# Patient Record
Sex: FEMALE | Race: WHITE | Employment: FULL TIME | URBAN - METROPOLITAN AREA
[De-identification: names, ages, dates, MRNs, and addresses within clinical notes are randomized per-mention and may not be internally consistent; named-entity substitution may affect disease eponyms.]

---

## 2017-04-03 ENCOUNTER — OFFICE VISIT (OUTPATIENT)
Dept: FAMILY MEDICINE | Facility: CLINIC | Age: 42
End: 2017-04-03
Payer: COMMERCIAL

## 2017-04-03 VITALS
HEART RATE: 107 BPM | DIASTOLIC BLOOD PRESSURE: 70 MMHG | OXYGEN SATURATION: 98 % | TEMPERATURE: 98.4 F | SYSTOLIC BLOOD PRESSURE: 109 MMHG

## 2017-04-03 DIAGNOSIS — R05.9 COUGH: Primary | ICD-10-CM

## 2017-04-03 PROCEDURE — 99213 OFFICE O/P EST LOW 20 MIN: CPT | Performed by: FAMILY MEDICINE

## 2017-04-03 RX ORDER — AZITHROMYCIN 250 MG/1
TABLET, FILM COATED ORAL
Qty: 6 TABLET | Refills: 0 | Status: SHIPPED | OUTPATIENT
Start: 2017-04-03 | End: 2017-04-13

## 2017-04-03 RX ORDER — BENZONATATE 200 MG/1
200 CAPSULE ORAL 3 TIMES DAILY PRN
Qty: 21 CAPSULE | Refills: 0 | Status: SHIPPED | OUTPATIENT
Start: 2017-04-03 | End: 2017-04-13

## 2017-04-03 NOTE — MR AVS SNAPSHOT
"              After Visit Summary   4/3/2017    Nidhi Nolan    MRN: [id number]           Patient Information     Date Of Birth          1975        Visit Information        Provider Department      4/3/2017 2:00 PM David Rodriguez MD [facility]        Today's Diagnoses     Cough    -  1       Follow-ups after your visit        Who to contact     If you have questions or need follow up information about today's clinic visit or your schedule please contact [facility] directly at [phone].  Normal or non-critical lab and imaging results will be communicated to you by [email or web address], letter or phone within 4 business days after the clinic has received the results. If you do not hear from us within 7 days, please contact the clinic through [email or web address] or phone. If you have a critical or abnormal lab result, we will notify you by phone as soon as possible.  Submit refill requests through [facility] or call your pharmacy and they will forward the refill request to us. Please allow 3 business days for your refill to be completed.          Additional Information About Your Visit        MyChart Information     [facility] lets you send messages to your doctor, view your test results, renew your prescriptions, schedule appointments and more. To sign up, go to [email or web address] . Click on \"Log in\" on the left side of the screen, which will take you to the Welcome page. Then click on \"Sign up Now\" on the right side of the page.     You will be asked to enter the access code listed below, as well as some personal information. Please follow the directions to create your username and password.     Your access code is: [email or web address]  Expires: 2017  2:19 PM     Your access code will  in 90 days. If you need help or a new code, please call your [facility] or [phone].        Care EveryWhere ID     This is your Care EveryWhere ID. This could be used by other organizations to " access your [place] medical records  [id number]        Your Vitals Were     Pulse Temperature Pulse Oximetry             107 98.4  F (36.9  C) (Oral) 98%          Blood Pressure from Last 3 Encounters:   04/03/17 109/70   10/22/15 110/78   04/21/15 121/70    Weight from Last 3 Encounters:   12/12/16 167 lb (75.8 kg)   10/22/15 170 lb 9.6 oz (77.4 kg)   04/21/15 166 lb 6.4 oz (75.5 kg)              Today, you had the following     No orders found for display       Primary Care Provider    Eddy Espinal MD       No address on file        Thank you!     Thank you for choosing [facility]  for your care. Our goal is always to provide you with excellent care. Hearing back from our patients is one way we can continue to improve our services. Please take a few minutes to complete the written survey that you may receive in the mail after your visit with us. Thank you!             Your Updated Medication List - Protect others around you: Learn how to safely use, store and throw away your medicines at www.disposemymeds.org.          This list is accurate as of: 4/3/17  2:19 PM.  Always use your most recent med list.                   Brand Name Dispense Instructions for use    famciclovir 500 MG tablet    FAMVIR    60 tablet    Take 1 tablet (500 mg) by mouth 3 times daily       ferrous sulfate 325 (65 FE) MG tablet    IRON     Take 1 tablet (325 mg) by mouth At Bedtime       hydrOXYzine 25 MG tablet    ATARAX    30 tablet    Take 1-2 tablets by mouth every 6 hours as needed for itching.       mupirocin 2 % ointment    BACTROBAN    30 g    apply to nares with q-tip twice daily until symptoms resolve       pramipexole 0.125 MG tablet    MIRAPEX    90 tablet    Take 1 tablet (0.125 mg) by mouth At Bedtime       rizatriptan 5 MG tablet    MAXALT    18 tablet    Take 1-2 tablets (5-10 mg) by mouth at onset of headache for migraine May repeat dose in 2 hours.  Do not exceed 30 mg in 24 hours       SOLUBLE  FIBER/PROBIOTICS Chew     30 tablet    Take 1 tablet by mouth daily.       triamcinolone 0.1 % cream    KENALOG    60 g    Apply topically 2 times daily Apply sparingly to affected area of arms, legs, and trunk.

## 2017-04-03 NOTE — PROGRESS NOTES
SUBJECTIVE:                                                    Nidhi Nolan is a 42 year old female who presents to clinic today for the following health issues:    RESPIRATORY SYMPTOMS      Duration: x5 days    Description  nasal congestion, rhinorrhea, sore throat, facial pain/pressure, cough, chills, ear pain right, headache, fatigue/malaise, hoarse voice and myalgias    Severity: moderate    Accompanying signs and symptoms: None    History (predisposing factors):  none    Precipitating or alleviating factors: None    Therapies tried and outcome:  OTC NSAID     Past Medical History:   Diagnosis Date     Abnormal Pap smear of cervix     done elsewhere     History of colposcopy with cervical biopsy     done elsewhere       Current Outpatient Prescriptions   Medication Sig Dispense Refill     pramipexole (MIRAPEX) 0.125 MG tablet Take 1 tablet (0.125 mg) by mouth At Bedtime 90 tablet 3     rizatriptan (MAXALT) 5 MG tablet Take 1-2 tablets (5-10 mg) by mouth at onset of headache for migraine May repeat dose in 2 hours.  Do not exceed 30 mg in 24 hours 18 tablet 1     ferrous sulfate (IRON) 325 (65 FE) MG tablet Take 1 tablet (325 mg) by mouth At Bedtime       famciclovir (FAMVIR) 500 MG tablet Take 1 tablet (500 mg) by mouth 3 times daily 60 tablet 3     mupirocin (BACTROBAN) 2 % ointment apply to nares with q-tip twice daily until symptoms resolve 30 g 0     triamcinolone (KENALOG) 0.1 % cream Apply topically 2 times daily Apply sparingly to affected area of arms, legs, and trunk. 60 g 0     hydrOXYzine (ATARAX) 25 MG tablet Take 1-2 tablets by mouth every 6 hours as needed for itching. 30 tablet 1     Probiotic Product (SOLUBLE FIBER/PROBIOTICS) CHEW Take 1 tablet by mouth daily. 30 tablet 12       Social History   Substance Use Topics     Smoking status: Never Smoker     Smokeless tobacco: Never Used     Alcohol use Yes      Comment: kassy FLAHERTY  Review of systems negative except as stated  above.    OBJECTIVE:  /70  Pulse 107  Temp 98.4  F (36.9  C) (Oral)  SpO2 98%     GENERAL APPEARANCE: healthy, alert and mild distress  EYES: EOMI,  PERRL, conjunctiva clear  HENT: ear canals and TM's normal.  Nose and mouth without ulcers, erythema or lesions  NECK: supple, nontender, no lymphadenopathy  RESP: lungs clear to auscultation - no rales, rhonchi or wheezes  CV: regular rates and rhythm, normal S1 S2, no murmur noted  NEURO: Normal strength and tone, sensory exam grossly normal,  normal speech and mentation  SKIN: no suspicious lesions or rashes    ASSESSMENT:  Cough    PLAN:  abx and cough med  See orders in Epic  Symptomatic measures encouraged, humidified air, plenty of fluids.

## 2017-04-03 NOTE — NURSING NOTE
"Chief Complaint   Patient presents with     URI       Initial /70  Pulse 107  Temp 98.4  F (36.9  C) (Oral)  SpO2 98% Estimated body mass index is 25.96 kg/(m^2) as calculated from the following:    Height as of 10/22/15: 5' 7.25\" (1.708 m).    Weight as of 12/12/16: 167 lb (75.8 kg).  Medication Reconciliation: complete     Sri Hernandez CMA (AAMA)        "

## 2017-04-13 ENCOUNTER — TELEPHONE (OUTPATIENT)
Dept: FAMILY MEDICINE | Facility: CLINIC | Age: 42
End: 2017-04-13

## 2017-04-13 NOTE — TELEPHONE ENCOUNTER
"Pt calling c/o on going cough.   She states it is worse at night while laying down.   She also feels like ears may be plugged \"because sometime I am getting dizzy\"     Pt denies fever, no wheezing, chest pain  or SOB.  Denies HA, N/V no sinus pressure     She recently finished Zpack for \"bronchilits\"     Offered appt but pt is out of town for the next 2 days.     Advised Add Neti-pot or sinus wash 2-4 X day. Push fluids. Can add OTC nasal steroid.  Try Afrin for no more than 3 days.  Try OTC Delsym or Robitussin.   If develops fever, chest pain or develops respiratory symptoms be seen in UC while out of town.   F/U in clinic as needed.     Ghislaine Escalona, RN        "

## 2017-05-26 DIAGNOSIS — G25.81 RESTLESS LEGS SYNDROME (RLS): ICD-10-CM

## 2017-05-26 RX ORDER — PRAMIPEXOLE DIHYDROCHLORIDE 0.12 MG/1
TABLET ORAL
Qty: 90 TABLET | Refills: 1 | OUTPATIENT
Start: 2017-05-26

## 2017-05-26 NOTE — TELEPHONE ENCOUNTER
Pending Prescriptions:                       Disp   Refills    pramipexole (MIRAPEX) 0.125 MG tablet [Ph*90 tab*1            Sig: TAKE ONE TABLET BY MOUTH AT BEDTIME               Last Written Prescription Date: 12/12/2016  Last Fill Quantity: 90, # refills: 3  Last Office Visit with [facility], [facility] or [facility] prescribing provider: 12/12/2016, Sharita        BP Readings from Last 3 Encounters:   04/03/17 109/70   10/22/15 110/78   04/21/15 121/70

## 2017-07-27 ENCOUNTER — TRANSFERRED RECORDS (OUTPATIENT)
Dept: HEALTH INFORMATION MANAGEMENT | Facility: CLINIC | Age: 42
End: 2017-07-27

## 2017-12-13 DIAGNOSIS — G25.81 RESTLESS LEGS SYNDROME (RLS): ICD-10-CM

## 2017-12-14 RX ORDER — PRAMIPEXOLE DIHYDROCHLORIDE 0.12 MG/1
0.12 TABLET ORAL AT BEDTIME
Qty: 30 TABLET | Refills: 0 | Status: SHIPPED | OUTPATIENT
Start: 2017-12-14 | End: 2018-01-26

## 2017-12-14 NOTE — TELEPHONE ENCOUNTER
Prescription approved per [facility] Refill Protocol.  For 30 day fill and LM again today for call back to schedule OV    Ghislaine Escalona RN

## 2018-01-17 ENCOUNTER — TRANSFERRED RECORDS (OUTPATIENT)
Dept: HEALTH INFORMATION MANAGEMENT | Facility: CLINIC | Age: 43
End: 2018-01-17

## 2018-01-22 ENCOUNTER — ALLIED HEALTH/NURSE VISIT (OUTPATIENT)
Dept: NURSING | Facility: CLINIC | Age: 43
End: 2018-01-22
Payer: COMMERCIAL

## 2018-01-22 DIAGNOSIS — Z23 NEED FOR PROPHYLACTIC VACCINATION AND INOCULATION AGAINST INFLUENZA: Primary | ICD-10-CM

## 2018-01-22 PROCEDURE — 90686 IIV4 VACC NO PRSV 0.5 ML IM: CPT

## 2018-01-22 PROCEDURE — 90471 IMMUNIZATION ADMIN: CPT

## 2018-01-22 NOTE — MR AVS SNAPSHOT
"              After Visit Summary   1/22/2018    Nidhi Nolan    MRN: [id number]           Patient Information     Date Of Birth          1975        Visit Information        Provider Department      1/22/2018 3:00 PM ESTER XIONG/LPN [facility]        Today's Diagnoses     Need for prophylactic vaccination and inoculation against influenza    -  1       Follow-ups after your visit        Your next 10 appointments already scheduled     Jan 22, 2018  3:00 PM CST   Nurse Only with LV MA/LPN   [facility] ([facility]    [address] [address] [phone]              Who to contact     If you have questions or need follow up information about today's clinic visit or your schedule please contact [facility] directly at [phone].  Normal or non-critical lab and imaging results will be communicated to you by MyChart, letter or phone within 4 business days after the clinic has received the results. If you do not hear from us within 7 days, please contact the clinic through MyChart or phone. If you have a critical or abnormal lab result, we will notify you by phone as soon as possible.  Submit refill requests through [employer] or call your pharmacy and they will forward the refill request to us. Please allow 3 business days for your refill to be completed.          Additional Information About Your Visit        MyChart Information     [employer] lets you send messages to your doctor, view your test results, renew your prescriptions, schedule appointments and more. To sign up, go to [email or web address] . Click on \"Log in\" on the left side of the screen, which will take you to the Welcome page. Then click on \"Sign up Now\" on the right side of the page.     You will be asked to enter the access code listed below, as well as some personal information. Please follow the directions to create your username and password.     Your access " code is: [email or web address]  Expires: 2018  2:23 PM     Your access code will  in 90 days. If you need help or a new code, please call your [place] clinic or [phone].        Care EveryWhere ID     This is your Care EveryWhere ID. This could be used by other organizations to access your [place] medical records  [id number]         Blood Pressure from Last 3 Encounters:   17 109/70   10/22/15 110/78   04/21/15 121/70    Weight from Last 3 Encounters:   16 167 lb (75.8 kg)   10/22/15 170 lb 9.6 oz (77.4 kg)   04/21/15 166 lb 6.4 oz (75.5 kg)              We Performed the Following     FLU VAC, SPLIT VIRUS IM > 3 YO (QUADRIVALENT) [76798]     Vaccine Administration, Initial [43418]        Primary Care Provider Fax #    Physician No Ref-Primary [phone]       No address on file        Equal Access to Services     ALLEN FLORES : Hadii aad ku hadasho Soomaali, waaxda luqadaha, qaybta kaalmada adeegyada, waxay idiin hayelvinn angelina alejandre . So [facility] [place] [phone].    ATENCIÓN: Si habla español, tiene a alexis disposición servicios gratuitos de asistencia lingüística. Llame al [phone].    We comply with applicable federal civil rights laws and [place] laws. We do not discriminate on the basis of race, color, national origin, age, disability, sex, sexual orientation, or gender identity.            Thank you!     Thank you for choosing [facility]  for your care. Our goal is always to provide you with excellent care. Hearing back from our patients is one way we can continue to improve our services. Please take a few minutes to complete the written survey that you may receive in the mail after your visit with us. Thank you!             Your Updated Medication List - Protect others around you: Learn how to safely use, store and throw away your medicines at www.disposemymeds.org.          This list is accurate as of: 18  2:23 PM.  Always use your most recent med list.                    Brand Name Dispense Instructions for use Diagnosis    famciclovir 500 MG tablet    FAMVIR    60 tablet    Take 1 tablet (500 mg) by mouth 3 times daily    HSV (herpes simplex virus) infection       ferrous sulfate 325 (65 FE) MG tablet    IRON     Take 1 tablet (325 mg) by mouth At Bedtime    Restless legs syndrome (RLS)       hydrOXYzine 25 MG tablet    ATARAX    30 tablet    Take 1-2 tablets by mouth every 6 hours as needed for itching.    Atopic dermatitis       mupirocin 2 % ointment    BACTROBAN    30 g    apply to nares with q-tip twice daily until symptoms resolve    Impetigo       pramipexole 0.125 MG tablet    MIRAPEX    30 tablet    Take 1 tablet (0.125 mg) by mouth At Bedtime    Restless legs syndrome (RLS)       rizatriptan 5 MG tablet    MAXALT    18 tablet    Take 1-2 tablets (5-10 mg) by mouth at onset of headache for migraine May repeat dose in 2 hours.  Do not exceed 30 mg in 24 hours    Migraine without aura and without status migrainosus, not intractable       SOLUBLE FIBER/PROBIOTICS Chew     30 tablet    Take 1 tablet by mouth daily.    Yeast infection of the vagina       triamcinolone 0.1 % cream    KENALOG    60 g    Apply topically 2 times daily Apply sparingly to affected area of arms, legs, and trunk.    Atopic dermatitis

## 2018-01-22 NOTE — PROGRESS NOTES

## 2018-01-24 DIAGNOSIS — G25.81 RESTLESS LEGS SYNDROME (RLS): ICD-10-CM

## 2018-01-24 NOTE — TELEPHONE ENCOUNTER
Routing refill request to provider for review/approval because:  Kia given x1 and patient did not follow up, please advise  Margarita Bojorquez RN, BSN

## 2018-01-24 NOTE — TELEPHONE ENCOUNTER
"Requested Prescriptions   Pending Prescriptions Disp Refills     pramipexole (MIRAPEX) 0.125 MG tablet [Pharmacy Med Name: PRAMIPEXOLE 0.125 MG TABLET] 30 tablet 0    Last Written Prescription Date:  12/14/2017  Last Fill Quantity: 30 tablet,  # refills: 0   Last Office Visit with [facility], [facility] or [facility] prescribing provider:  12/12/2016   Future Office Visit:      Sig: PT NEEDS TO BE SEEN-TAKE 1 TABLET (0.125 MG) BY MOUTH AT BEDTIME    Antiparkinson's Agents Protocol Passed    1/24/2018  9:07 AM       Passed - Blood pressure under 140/90    BP Readings from Last 3 Encounters:   04/03/17 109/70   10/22/15 110/78   04/21/15 121/70                Passed - Recent or future visit with authorizing provider's specialty    Patient had office visit in the last year or has a visit in the next 30 days with authorizing provider.  See \"Patient Info\" tab in inbasket, or \"Choose Columns\" in Meds & Orders section of the refill encounter.            Passed - Patient is age 18 or older       Passed - No active pregnancy on record       Passed - No positive pregnancy test in the past 12 months          Justin CAMERON  "

## 2018-01-25 RX ORDER — PRAMIPEXOLE DIHYDROCHLORIDE 0.12 MG/1
TABLET ORAL
Qty: 30 TABLET | Refills: 0 | OUTPATIENT
Start: 2018-01-25

## 2018-01-26 RX ORDER — PRAMIPEXOLE DIHYDROCHLORIDE 0.12 MG/1
0.12 TABLET ORAL AT BEDTIME
Qty: 30 TABLET | Refills: 1 | Status: SHIPPED | OUTPATIENT
Start: 2018-01-26 | End: 2018-01-31

## 2018-01-26 NOTE — TELEPHONE ENCOUNTER
"Pt calling stating she \"can't sleep without this medication\" and that she never saw that she needs an appt or got calls from the clinic.  Reviewed phone number to make sure we have the correct number.    Pt is wanting a refill prior to her appt 7on 1/31/18.    Please advise   Margarita Bojorquez RN, BSN    "

## 2018-01-26 NOTE — TELEPHONE ENCOUNTER
Refilled 1 month to give her time to follow-up.  Should have annual visit for any ongoing prescription.  Scheduled follow-up ok.

## 2018-01-31 ENCOUNTER — OFFICE VISIT (OUTPATIENT)
Dept: FAMILY MEDICINE | Facility: CLINIC | Age: 43
End: 2018-01-31
Payer: COMMERCIAL

## 2018-01-31 VITALS
HEART RATE: 80 BPM | WEIGHT: 164.9 LBS | DIASTOLIC BLOOD PRESSURE: 70 MMHG | TEMPERATURE: 97.8 F | BODY MASS INDEX: 25.88 KG/M2 | HEIGHT: 67 IN | SYSTOLIC BLOOD PRESSURE: 106 MMHG | OXYGEN SATURATION: 98 %

## 2018-01-31 DIAGNOSIS — B00.9 HSV (HERPES SIMPLEX VIRUS) INFECTION: ICD-10-CM

## 2018-01-31 DIAGNOSIS — G25.81 RESTLESS LEGS SYNDROME (RLS): ICD-10-CM

## 2018-01-31 DIAGNOSIS — Z00.00 ROUTINE ADULT HEALTH MAINTENANCE: Primary | ICD-10-CM

## 2018-01-31 DIAGNOSIS — G43.009 MIGRAINE WITHOUT AURA AND WITHOUT STATUS MIGRAINOSUS, NOT INTRACTABLE: ICD-10-CM

## 2018-01-31 LAB
CHOLEST SERPL-MCNC: 163 MG/DL
GLUCOSE SERPL-MCNC: 81 MG/DL (ref 70–99)
HDLC SERPL-MCNC: 70 MG/DL
LDLC SERPL CALC-MCNC: 77 MG/DL
NONHDLC SERPL-MCNC: 93 MG/DL
TRIGL SERPL-MCNC: 80 MG/DL

## 2018-01-31 PROCEDURE — 99396 PREV VISIT EST AGE 40-64: CPT | Performed by: NURSE PRACTITIONER

## 2018-01-31 PROCEDURE — 36415 COLL VENOUS BLD VENIPUNCTURE: CPT | Performed by: NURSE PRACTITIONER

## 2018-01-31 PROCEDURE — 82947 ASSAY GLUCOSE BLOOD QUANT: CPT | Performed by: NURSE PRACTITIONER

## 2018-01-31 PROCEDURE — 80061 LIPID PANEL: CPT | Performed by: NURSE PRACTITIONER

## 2018-01-31 RX ORDER — RIZATRIPTAN BENZOATE 5 MG/1
5-10 TABLET ORAL
Qty: 18 TABLET | Refills: 1 | Status: SHIPPED | OUTPATIENT
Start: 2018-01-31

## 2018-01-31 RX ORDER — FAMCICLOVIR 500 MG/1
500 TABLET ORAL 3 TIMES DAILY
Qty: 60 TABLET | Refills: 3 | Status: SHIPPED | OUTPATIENT
Start: 2018-01-31

## 2018-01-31 RX ORDER — PRAMIPEXOLE DIHYDROCHLORIDE 0.12 MG/1
0.12 TABLET ORAL AT BEDTIME
Qty: 90 TABLET | Refills: 3 | Status: SHIPPED | OUTPATIENT
Start: 2018-01-31 | End: 2018-05-07

## 2018-01-31 NOTE — PROGRESS NOTES
SUBJECTIVE:   CC: Nihdi Nolan is an 42 year old woman who presents for preventive health visit.     Physical   Annual:     Getting at least 3 servings of Calcium per day::  Yes    Bi-annual eye exam::  NO    Dental care twice a year::  Yes    Sleep apnea or symptoms of sleep apnea::  None    Diet::  Regular (no restrictions)    Frequency of exercise::  6-7 days/week    Duration of exercise::  45-60 minutes    Taking medications regularly::  Yes    Additional concerns today::  No          Here for a complete physical exam. Declines a pap today. 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 Requesting refills on medications. No other concerns. Fasting for labs.           Today's PHQ-2 Score:   PHQ-2 ( 1999 Pfizer) 1/31/2018   Q1: Little interest or pleasure in doing things 0   Q2: Feeling down, depressed or hopeless 0   PHQ-2 Score 0   Q1: Little interest or pleasure in doing things Not at all   Q2: Feeling down, depressed or hopeless Not at all   PHQ-2 Score 0       Abuse: Current or Past(Physical, Sexual or Emotional)- No  Do you feel safe in your environment - Yes    Social History   Substance Use Topics     Smoking status: Never Smoker     Smokeless tobacco: Never Used     Alcohol use Yes      Comment: kassy     Alcohol Use 1/31/2018   If you drink alcohol, do you typically have greater than 3 drinks per day OR greater than 7 drinks per week?   No   No flowsheet data found.    Reviewed orders with patient.  Reviewed health maintenance and updated orders accordingly - Yes  Patient Active Problem List   Diagnosis     Migraines     Hyperlipidemia LDL goal <160     Past Surgical History:   Procedure Laterality Date     GYN SURGERY         Social History   Substance Use Topics     Smoking status: Never Smoker     Smokeless tobacco: Never Used     Alcohol use Yes      Comment: kassy     Family History   Problem Relation Age of Onset     Hypertension Mother      Hypertension Father      CANCER  "Father      multiple mieloma     C.A.D. Maternal Grandfather      MI     Cardiovascular Maternal Grandfather      Breast Cancer Paternal Grandmother            Mammo discussed, not appropriate for or declined by this patient.    Pertinent mammograms are reviewed under the imaging tab.  History of abnormal Pap smear: NO - age 30- 65 PAP every 3 years recommended    Reviewed and updated as needed this visit by clinical staff  Tobacco  Allergies  Meds  Med Hx  Surg Hx  Fam Hx  Soc Hx        Reviewed and updated as needed this visit by Provider  Allergies  Meds  Med Hx  Surg Hx  Fam Hx            Review of Systems  C: NEGATIVE for fever, chills, change in weight  I: NEGATIVE for worrisome rashes, moles or lesions  E: NEGATIVE for vision changes or irritation  ENT: NEGATIVE for ear, mouth and throat problems  R: NEGATIVE for significant cough or SOB  B: NEGATIVE for masses, tenderness or discharge  CV: NEGATIVE for chest pain, palpitations or peripheral edema  GI: NEGATIVE for nausea, abdominal pain, heartburn, or change in bowel habits  : NEGATIVE for unusual urinary or vaginal symptoms. Periods are regular.  M: NEGATIVE for significant arthralgias or myalgia  N: NEGATIVE for weakness, dizziness or paresthesias  P: NEGATIVE for changes in mood or affect     OBJECTIVE:   /70 (BP Location: Right arm, Patient Position: Chair, Cuff Size: Adult Regular)  Pulse 80  Temp 97.8  F (36.6  C) (Oral)  Ht 5' 7.25\" (1.708 m)  Wt 164 lb 14.4 oz (74.8 kg)  LMP 01/21/2018 (Approximate)  SpO2 98%  Breastfeeding? No  BMI 25.64 kg/m2  Physical Exam  GENERAL: healthy, alert and no distress  EYES: Eyes grossly normal to inspection, PERRL and conjunctivae and sclerae normal  HENT: ear canals and TM's normal, nose and mouth without ulcers or lesions  NECK: no adenopathy, no asymmetry, masses, or scars and thyroid normal to palpation  RESP: lungs clear to auscultation - no rales, rhonchi or wheezes  CV: regular rate " "and rhythm, normal S1 S2, no S3 or S4, no murmur, click or rub, no peripheral edema and peripheral pulses strong  ABDOMEN: soft, nontender, no hepatosplenomegaly, no masses and bowel sounds normal  MS: cyst and swelling of right knee, managed by orthopedics.   SKIN: no suspicious lesions or rashes  PSYCH: mentation appears normal, affect normal/bright    ASSESSMENT/PLAN:   1. Routine adult health maintenance  Fasting labs drawn.   - GLUCOSE  - Lipid panel reflex to direct LDL Fasting    2. Restless legs syndrome (RLS)  Using Mirapex nightly and is effective.  - pramipexole (MIRAPEX) 0.125 MG tablet; Take 1 tablet (0.125 mg) by mouth At Bedtime  Dispense: 90 tablet; Refill: 3    3. Migraine without aura and without status migrainosus, not intractable   Rarely having migraines but is requesting a refill on Maxalt.  - rizatriptan (MAXALT) 5 MG tablet; Take 1-2 tablets (5-10 mg) by mouth at onset of headache for migraine May repeat dose in 2 hours.  Do not exceed 30 mg in 24 hours  Dispense: 18 tablet; Refill: 1    4. HSV (herpes simplex virus) infection   Stable. Refilled.  - famciclovir (FAMVIR) 500 MG tablet; Take 1 tablet (500 mg) by mouth 3 times daily  Dispense: 60 tablet; Refill: 3    COUNSELING:  Reviewed preventive health counseling, as reflected in patient instructions       Regular exercise       Healthy diet/nutrition       Vision screening         reports that she has never smoked. She has never used smokeless tobacco.    Estimated body mass index is 25.64 kg/(m^2) as calculated from the following:    Height as of this encounter: 5' 7.25\" (1.708 m).    Weight as of this encounter: 164 lb 14.4 oz (74.8 kg).       Counseling Resources:  ATP IV Guidelines  Pooled Cohorts Equation Calculator  Breast Cancer Risk Calculator  FRAX Risk Assessment  ICSI Preventive Guidelines  Dietary Guidelines for Americans, 2010  USDA's MyPlate  ASA Prophylaxis  Lung CA Screening    Alicia Walsh, DAVID  [facility] " [place]  Answers for HPI/ROS submitted by the patient on 1/31/2018   PHQ-2 Score: 0

## 2018-01-31 NOTE — NURSING NOTE
"Chief Complaint   Patient presents with     Physical       Initial /70 (BP Location: Right arm, Patient Position: Chair, Cuff Size: Adult Regular)  Pulse 80  Temp 97.8  F (36.6  C) (Oral)  Ht 5' 7.25\" (1.708 m)  Wt 164 lb 14.4 oz (74.8 kg)  LMP 01/21/2018 (Approximate)  SpO2 98%  Breastfeeding? No  BMI 25.64 kg/m2 Estimated body mass index is 25.64 kg/(m^2) as calculated from the following:    Height as of this encounter: 5' 7.25\" (1.708 m).    Weight as of this encounter: 164 lb 14.4 oz (74.8 kg).  Medication Reconciliation: complete   Taylor Morgan SMA      "

## 2018-01-31 NOTE — MR AVS SNAPSHOT
After Visit Summary   1/31/2018    Nidhi Nolan    MRN: [id number]           Patient Information     Date Of Birth          1975        Visit Information        Provider Department      1/31/2018 8:00 AM Alicia Walsh NP [facility]        Today's Diagnoses     Routine adult health maintenance    -  1    Restless legs syndrome (RLS)        Migraine without aura and without status migrainosus, not intractable        HSV (herpes simplex virus) infection          Care Instructions      Preventive Health Recommendations  Female Ages 40 to 49    Yearly exam:     See your health care provider every year in order to  1. Review health changes.   2. Discuss preventive care.    3. Review your medicines if your doctor prescribed any.      Get a Pap test every three years (unless you have an abnormal result and your provider advises testing more often).      If you get Pap tests with HPV test, you only need to test every 5 years, unless you have an abnormal result. You do not need a Pap test if your uterus was removed (hysterectomy) and you have not had cancer.      You should be tested each year for STDs (sexually transmitted diseases), if you're at risk.       Ask your doctor if you should have a mammogram.      Have a colonoscopy (test for colon cancer) if someone in your family has had colon cancer or polyps before age 50.       Have a cholesterol test every 5 years.       Have a diabetes test (fasting glucose) after age 45. If you are at risk for diabetes, you should have this test every 3 years.    Shots: Get a flu shot each year. Get a tetanus shot every 10 years.     Nutrition:     Eat at least 5 servings of fruits and vegetables each day.    Eat whole-grain bread, whole-wheat pasta and brown rice instead of white grains and rice.    Talk to your provider about Calcium and Vitamin D.     Lifestyle    Exercise at least 150 minutes a week (an average of 30 minutes a day, 5 days a  week). This will help you control your weight and prevent disease.    Limit alcohol to one drink per day.    No smoking.     Wear sunscreen to prevent skin cancer.    See your dentist every six months for an exam and cleaning.          Follow-ups after your visit        Your next 10 appointments already scheduled     Feb 07, 2018  3:00 PM CST   (Arrive by 2:45 PM)   US JOINT INJECTION ASPIRATION INTERMEDIATE RIGHT with RSCCUS1, [facility] IMAGING NURSE, [facility] ([facility])    [address]  [facility] [address] [phone]           Tell us in advance if there s any chance you may be pregnant.  Bring a list of your medicines to the exam. Include vitamins, minerals and over-the-counter drugs.  If you take blood thinners, you may need to stop taking them a few days before treatment. Talk to your doctor before stopping these medicines. You will need a blood test the morning of your exam.   Stop taking Coumadin (warfarin) 3 days before your exam. Restart the day after your exam.   If you take aspirin, you may need to stop taking it 3 days before your scan.   If you take Plavix, Ticlid, Pletal or Persantine, you may need to stop taking them 5 days before your scan. Please talk to your doctor before stopping these medicines.  If you will receive sedation for this test (medicine to help you relax):   See your family doctor for an exam within 30 days of treatment.   Plan for an adult to drive you home and stay with you for at least 6 hours.   Follow the eating and drinking guidelines checked below:   No eating or drinking for 4 hours before your test. You may take medicine with small sips of water.   If you have diabetes:If you take insulin, call your diabetes care team. Do not take diabetes pills on the morning of your test. If you take metformin (Avandamet, Glucophage, Glucovance, Metaglip) and received contrast, wait 48 hours before  "re-starting this medicine.  Please call the Imaging Department at your exam site with any questions.              Who to contact     If you have questions or need follow up information about today's clinic visit or your schedule please contact [facility] directly at [phone].  Normal or non-critical lab and imaging results will be communicated to you by MyChart, letter or phone within 4 business days after the clinic has received the results. If you do not hear from us within 7 days, please contact the clinic through [employer] or phone. If you have a critical or abnormal lab result, we will notify you by phone as soon as possible.  Submit refill requests through [employer] or call your pharmacy and they will forward the refill request to us. Please allow 3 business days for your refill to be completed.          Additional Information About Your Visit        MyChart Information     [employer] lets you send messages to your doctor, view your test results, renew your prescriptions, schedule appointments and more. To sign up, go to [email or web address] . Click on \"Log in\" on the left side of the screen, which will take you to the Welcome page. Then click on \"Sign up Now\" on the right side of the page.     You will be asked to enter the access code listed below, as well as some personal information. Please follow the directions to create your username and password.     Your access code is: [email or web address]  Expires: 2018  2:23 PM     Your access code will  in 90 days. If you need help or a new code, please call your [facility] or [phone].        Care EveryWhere ID     This is your Care EveryWhere ID. This could be used by other organizations to access your [facility] medical records  [id number]        Your Vitals Were     Pulse Temperature Height Last Period Pulse Oximetry Breastfeeding?    80 97.8  F (36.6  C) (Oral) 5' 7.25\" (1.708 m) 2018 (Approximate) 98% No    BMI (Body Mass " Index)                   25.64 kg/m2            Blood Pressure from Last 3 Encounters:   01/31/18 106/70   04/03/17 109/70   10/22/15 110/78    Weight from Last 3 Encounters:   01/31/18 164 lb 14.4 oz (74.8 kg)   12/12/16 167 lb (75.8 kg)   10/22/15 170 lb 9.6 oz (77.4 kg)              We Performed the Following     GLUCOSE     Lipid panel reflex to direct LDL Fasting          Where to get your medicines      These medications were sent to [address] [address] [place] [id number] [facility] [address]    Hours:  Tech issues with their phone system Phone:  [phone]     famciclovir 500 MG tablet    pramipexole 0.125 MG tablet    rizatriptan 5 MG tablet          Primary Care Provider Office Phone # Fax #    Alicia KIM DAVID Walsh [phone]       [facility] [address] [address] [address]        Equal Access to Services     ALLEN FLORES AH: Hadii aad ku hadasho Soomaali, waaxda luqadaha, qaybta kaalmada adeegyada, waxay idiin hayelvinn angelina alejandre . So [facility] [phone].    ATENCIÓN: Si habla español, tiene a alexis disposición servicios gratuitos de asistencia lingüística. Llame al [phone].    We comply with applicable federal civil rights laws and [place] laws. We do not discriminate on the basis of race, color, national origin, age, disability, sex, sexual orientation, or gender identity.            Thank you!     Thank you for choosing [facility]  for your care. Our goal is always to provide you with excellent care. Hearing back from our patients is one way we can continue to improve our services. Please take a few minutes to complete the written survey that you may receive in the mail after your visit with us. Thank you!             Your Updated Medication List - Protect others around you: Learn how to safely use, store and throw away your medicines at www.disposemymeds.org.          This list is accurate as of 1/31/18   8:36 AM.  Always use your most recent med list.                   Brand Name Dispense Instructions for use Diagnosis    famciclovir 500 MG tablet    FAMVIR    60 tablet    Take 1 tablet (500 mg) by mouth 3 times daily    HSV (herpes simplex virus) infection       pramipexole 0.125 MG tablet    MIRAPEX    90 tablet    Take 1 tablet (0.125 mg) by mouth At Bedtime    Restless legs syndrome (RLS)       rizatriptan 5 MG tablet    MAXALT    18 tablet    Take 1-2 tablets (5-10 mg) by mouth at onset of headache for migraine May repeat dose in 2 hours.  Do not exceed 30 mg in 24 hours    Migraine without aura and without status migrainosus, not intractable       SOLUBLE FIBER/PROBIOTICS Chew     30 tablet    Take 1 tablet by mouth daily.    Yeast infection of the vagina

## 2018-01-31 NOTE — LETTER
[facility]          [address] [place] [address]                                                                                                       [phone]      Nidhi Nolan  [address]  [facility] [place] [facility] [address]      Dear Nidhi,      Enclosed is a copy of the lab results.  The patient may continue her current plan of care.      Thank you for choosing [facility]. We appreciate the opportunity to serve you and look forward to supporting your healthcare needs in the future.    If you have any questions or concerns, please call me or my nurse at [phone].        Sincerely,      Alicia Walsh NP    Results for orders placed or performed in visit on 01/31/18   GLUCOSE   Result Value Ref Range    Glucose 81 70 - 99 mg/dL   Lipid panel reflex to direct LDL Fasting   Result Value Ref Range    Cholesterol 163 <200 mg/dL    Triglycerides 80 <150 mg/dL    HDL Cholesterol 70 >49 mg/dL    LDL Cholesterol Calculated 77 <100 mg/dL    Non HDL Cholesterol 93 <130 mg/dL

## 2018-02-07 ENCOUNTER — HOSPITAL ENCOUNTER (OUTPATIENT)
Dept: ULTRASOUND IMAGING | Facility: CLINIC | Age: 43
Discharge: HOME | End: 2018-02-07
Attending: ORTHOPAEDIC SURGERY | Admitting: ORTHOPAEDIC SURGERY
Payer: OTHER MISCELLANEOUS

## 2018-02-07 VITALS — HEART RATE: 79 BPM | SYSTOLIC BLOOD PRESSURE: 106 MMHG | DIASTOLIC BLOOD PRESSURE: 71 MMHG

## 2018-02-07 DIAGNOSIS — M25.561 KNEE PAIN, RIGHT: ICD-10-CM

## 2018-02-07 DIAGNOSIS — M71.21 BAKER'S CYST OF KNEE, RIGHT: ICD-10-CM

## 2018-02-07 PROCEDURE — 25000125 ZZHC RX 250: Performed by: PHYSICIAN ASSISTANT

## 2018-02-07 PROCEDURE — 20606 DRAIN/INJ JOINT/BURSA W/US: CPT | Mod: RT

## 2018-02-07 PROCEDURE — 25000125 ZZHC RX 250

## 2018-02-07 RX ORDER — LIDOCAINE HYDROCHLORIDE 10 MG/ML
10 INJECTION, SOLUTION EPIDURAL; INFILTRATION; INTRACAUDAL; PERINEURAL ONCE
Status: COMPLETED | OUTPATIENT
Start: 2018-02-07 | End: 2018-02-07

## 2018-02-07 RX ORDER — LIDOCAINE HYDROCHLORIDE 10 MG/ML
INJECTION, SOLUTION EPIDURAL; INFILTRATION; INTRACAUDAL; PERINEURAL
Status: COMPLETED
Start: 2018-02-07 | End: 2018-02-07

## 2018-02-07 RX ADMIN — LIDOCAINE HYDROCHLORIDE 3 ML: 10 INJECTION, SOLUTION EPIDURAL; INFILTRATION; INTRACAUDAL at 15:34

## 2018-02-07 RX ADMIN — LIDOCAINE HYDROCHLORIDE 3 ML: 10 INJECTION, SOLUTION EPIDURAL; INFILTRATION; INTRACAUDAL; PERINEURAL at 15:34

## 2018-02-07 RX ADMIN — LIDOCAINE HYDROCHLORIDE 3 ML: 10 INJECTION, SOLUTION EPIDURAL; INFILTRATION; INTRACAUDAL at 15:32

## 2018-02-07 NOTE — PROGRESS NOTES
Pt tolerated ortho procedure well, she was taken to Ultrasound for the procedure done by ALEKSANDER Lopez.Approximately 15 cc of clear yellow cystic fluid was removed from right bakers cyst. There were no complications during procedure. Pt verbalized understanding of written and verbal instructions and left department in stable and satisfactory condition. There is no evidence of bleeding upon discharge.

## 2018-03-12 ENCOUNTER — TELEPHONE (OUTPATIENT)
Dept: FAMILY MEDICINE | Facility: CLINIC | Age: 43
End: 2018-03-12

## 2018-03-12 DIAGNOSIS — B37.31 YEAST INFECTION OF THE VAGINA: Primary | ICD-10-CM

## 2018-03-12 RX ORDER — FLUCONAZOLE 150 MG/1
150 TABLET ORAL ONCE
Qty: 1 TABLET | Refills: 0 | Status: SHIPPED | OUTPATIENT
Start: 2018-03-12 | End: 2018-03-12

## 2018-03-12 NOTE — TELEPHONE ENCOUNTER
Patient is leaving to go on a trip 03/22, she will be spending a lot of time in the water and normally gets a yeast infection. She would like to know if Alicia can call in a Rx for her, patient states Dr. Duque did this a couple of years ago for her.  Patient can be reach on her cell phone if you have any questions.  Essence Garcias

## 2018-04-03 ENCOUNTER — OFFICE VISIT (OUTPATIENT)
Dept: FAMILY MEDICINE | Facility: CLINIC | Age: 43
End: 2018-04-03
Payer: COMMERCIAL

## 2018-04-03 VITALS — TEMPERATURE: 98.1 F | RESPIRATION RATE: 14 BRPM | OXYGEN SATURATION: 98 % | HEART RATE: 78 BPM

## 2018-04-03 DIAGNOSIS — R19.7 DIARRHEA, UNSPECIFIED TYPE: Primary | ICD-10-CM

## 2018-04-03 PROCEDURE — 99213 OFFICE O/P EST LOW 20 MIN: CPT | Performed by: NURSE PRACTITIONER

## 2018-04-03 NOTE — PROGRESS NOTES
SUBJECTIVE:   Nidhi Nolan is a 43 year old female who presents to clinic today for the following health issues:      ABDOMINAL PAIN     Onset: Ongoing For One Week. Started in [place] While on Vacation    Description:   Character: Sharp  Location: epigastric region  Radiation: None    Intensity: 5/10    Progression of Symptoms:  same    Accompanying Signs & Symptoms:  Fever/Chills?: YES- Chills  Gas/Bloating: YES- Bloating  Nausea: YES  Vomitting: no   Diarrhea?: YES  Constipation:no   Dysuria or Hematuria: no    History:   Trauma: no   Previous similar pain: no    Previous tests done: none    Precipitating factors:   Does the pain change with:     Food: YES- Gets Worse     BM: YES- Feels better     Urination: no     Alleviating factors:  Pepto Bismol has barely helped    Therapies Tried and outcome: Pepto Bismol and Imodium    LMP:  Approximately March 19.   Here with complaints of abdominal pain for the past week after returning from a trip to [place] [place] [facility] where she ate fresh fish and a salad. Has had ongoing issues with watery diarrhea and body aches. Chills and bloating. 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         Problem list and histories reviewed & adjusted, as indicated.  Additional history: none    Patient Active Problem List   Diagnosis     Migraines     Hyperlipidemia LDL goal <160     Past Surgical History:   Procedure Laterality Date     GYN SURGERY         Social History   Substance Use Topics     Smoking status: Never Smoker     Smokeless tobacco: Never Used     Alcohol use Yes      Comment: kassy     Family History   Problem Relation Age of Onset     Hypertension Mother      Hypertension Father      CANCER Father      multiple mieloma     C.A.D. Maternal Grandfather      MI     Cardiovascular Maternal Grandfather      Breast Cancer Paternal Grandmother            Reviewed and updated as needed this visit by clinical staff  Tobacco  Allergies   "Meds  Problems  Med Hx  Surg Hx  Fam Hx  Soc Hx        Reviewed and updated as needed this visit by Provider  Allergies  Meds  Problems  Med Hx  Surg Hx  Fam Hx         ROS:  Constitutional, HEENT, cardiovascular, pulmonary, gi and gu systems are negative, except as otherwise noted.    OBJECTIVE:     BP (P) 114/78 (BP Location: Right arm, Patient Position: Chair, Cuff Size: Adult Regular)  Pulse 78  Temp 98.1  F (36.7  C) (Oral)  Resp 14  Ht (P) 5' 7\" (1.702 m)  Wt (P) 158 lb 1.6 oz (71.7 kg)  SpO2 98%  BMI (P) 24.76 kg/m2  Body mass index is 24.76 kg/(m^2) (pended).  GENERAL: healthy, alert and no distress  EYES: Eyes grossly normal to inspection, PERRL and conjunctivae and sclerae normal  HENT: ear canals and TM's normal, nose and mouth without ulcers or lesions  NECK: no adenopathy, no asymmetry, masses, or scars and thyroid normal to palpation  RESP: lungs clear to auscultation - no rales, rhonchi or wheezes  CV: regular rate and rhythm, normal S1 S2, no S3 or S4, no murmur, click or rub, no peripheral edema and peripheral pulses strong  ABDOMEN: bowel sounds hyperactive. Tenderness throughout.   PSYCH: mentation appears normal, affect normal/bright        ASSESSMENT/PLAN:             1. Diarrhea, unspecified type  Will check stool cultures for infection. Advised to avoid use of Immodium for diarrhea.   - Enteric Bacteria and Virus Panel by GHASSAN Stool; Future  - Ova and Parasite Exam Routine; Future    Encouraged to focus on fluids and bland diet.     Alicia Walsh, NP  [facility]    "

## 2018-04-03 NOTE — MR AVS SNAPSHOT
"              After Visit Summary   4/3/2018    Nidhi Nolan    MRN: [id number]           Patient Information     Date Of Birth          1975        Visit Information        Provider Department      4/3/2018 10:45 AM Alicia Walsh NP [facility]        Today's Diagnoses     Diarrhea, unspecified type    -  1       Follow-ups after your visit        Your next 10 appointments already scheduled     Apr 10, 2018  2:00 PM CDT   (Arrive by 1:45 PM)   MA DIAGNOSTIC BILATERAL W/ LUCY with [id number]   [facility] ([facility])    [address]  [facility] [id number] [phone]           Do not use any powder, lotion or deodorant under your arms or on your breast. If you do, we will ask you to remove it before your exam.  Wear comfortable, two-piece clothing.  If you have any allergies, tell your care team.  Bring any previous mammograms from other facilities or have them mailed to the breast center.  Three-dimensional (3D) mammograms are available at [facility] locations in [facility], [facility], and [place]. [facility] locations include [place] and [facility] in [place]. Benefits of 3D mammograms include: - Improved rate of cancer detection - Decreases your chance of having to go back for more tests, which means fewer: - \"False-positive\" results (This means that there is an abnormal area but it isn't cancer.) - Invasive testing procedures, such as a biopsy or surgery - Can provide clearer images of the breast if you have dense breast tissue. 3D mammography is an optional exam that anyone can have with a 2D mammogram. It doesn't replace or take the place of a 2D mammogram. 2D mammograms remain an effective screening test for all women.  Not all insurance companies cover the cost of a 3D mammogram. Check with your insurance.            Apr 10, 2018  2:30 PM CDT   US BREAST RIGHT " "LIMITED 1-3 QUAD with RHBCUS1   [facility] Breast Ultrasound ([facility])    [address] [facility]  [facility] [address] [phone]           Please bring a list of your medicines (including vitamins, minerals and over-the-counter drugs). Also, tell your doctor about any allergies you may have. Wear comfortable clothes and leave your valuables at home.  You do not need to do anything special to prepare for your exam.  Please call the Imaging Department at your exam site with any questions.              Future tests that were ordered for you today     Open Future Orders        Priority Expected Expires Ordered    Enteric Bacteria and Virus Panel by GHASSAN Stool Routine  4/3/2019 4/3/2018    Ova and Parasite Exam Routine Routine  4/3/2019 4/3/2018            Who to contact     If you have questions or need follow up information about today's clinic visit or your schedule please contact [facility] directly at [phone].  Normal or non-critical lab and imaging results will be communicated to you by [employer], letter or phone within 4 business days after the clinic has received the results. If you do not hear from us within 7 days, please contact the clinic through [employer] or phone. If you have a critical or abnormal lab result, we will notify you by phone as soon as possible.  Submit refill requests through [employer] or call your pharmacy and they will forward the refill request to us. Please allow 3 business days for your refill to be completed.          Additional Information About Your Visit        [employer] Information     [employer] lets you send messages to your doctor, view your test results, renew your prescriptions, schedule appointments and more. To sign up, go to [email or web address] . Click on \"Log in\" on the left side of the screen, which will take you to the Welcome page. Then click on \"Sign up Now\" on the right side of the page.     You will be asked to " enter the access code listed below, as well as some personal information. Please follow the directions to create your username and password.     Your access code is: [email or web address]  Expires: 2018  3:23 PM     Your access code will  in 90 days. If you need help or a new code, please call your [facility] or [phone].        Care EveryWhere ID     This is your Care EveryWhere ID. This could be used by other organizations to access your [facility] medical records  [id number]        Your Vitals Were     Pulse Temperature Respirations Pulse Oximetry          78 98.1  F (36.7  C) (Oral) 14 98%         Blood Pressure from Last 3 Encounters:   18 (P) 114/78   18 106/71   18 106/70    Weight from Last 3 Encounters:   18 (P) 158 lb 1.6 oz (71.7 kg)   18 164 lb 14.4 oz (74.8 kg)   16 167 lb (75.8 kg)               Primary Care Provider Office Phone # Fax #    Alicia Walsh [phone]       [facility] [address] [place] [address]        Equal Access to Services     ALLEN FLORES AH: Hadii aad ku hadasho Soomaali, waaxda luqadaha, qaybta kaalmada adeegyada, waxay idiin hayelvinn angelina parmar laveronica adames. So [facility] [phone].    ATENCIÓN: Si habla español, tiene a alexis disposición servicios gratuitos de asistencia lingüística. Llame al [phone].    We comply with applicable federal civil rights laws and [place] laws. We do not discriminate on the basis of race, color, national origin, age, disability, sex, sexual orientation, or gender identity.            Thank you!     Thank you for choosing [facility]  for your care. Our goal is always to provide you with excellent care. Hearing back from our patients is one way we can continue to improve our services. Please take a few minutes to complete the written survey that you may receive in the mail after your visit with us. Thank you!             Your Updated Medication List -  Protect others around you: Learn how to safely use, store and throw away your medicines at www.disposemymeds.org.          This list is accurate as of 4/3/18 11:21 AM.  Always use your most recent med list.                   Brand Name Dispense Instructions for use Diagnosis    famciclovir 500 MG tablet    FAMVIR    60 tablet    Take 1 tablet (500 mg) by mouth 3 times daily    HSV (herpes simplex virus) infection       pramipexole 0.125 MG tablet    MIRAPEX    90 tablet    Take 1 tablet (0.125 mg) by mouth At Bedtime    Restless legs syndrome (RLS)       rizatriptan 5 MG tablet    MAXALT    18 tablet    Take 1-2 tablets (5-10 mg) by mouth at onset of headache for migraine May repeat dose in 2 hours.  Do not exceed 30 mg in 24 hours    Migraine without aura and without status migrainosus, not intractable       SOLUBLE FIBER/PROBIOTICS Chew     30 tablet    Take 1 tablet by mouth daily.    Yeast infection of the vagina

## 2018-04-04 DIAGNOSIS — R19.7 DIARRHEA, UNSPECIFIED TYPE: ICD-10-CM

## 2018-04-04 PROCEDURE — 87209 SMEAR COMPLEX STAIN: CPT | Performed by: NURSE PRACTITIONER

## 2018-04-04 PROCEDURE — 87506 IADNA-DNA/RNA PROBE TQ 6-11: CPT | Performed by: NURSE PRACTITIONER

## 2018-04-04 PROCEDURE — 87177 OVA AND PARASITES SMEARS: CPT | Performed by: NURSE PRACTITIONER

## 2018-04-05 LAB
O+P STL MICRO: NORMAL
O+P STL MICRO: NORMAL
SPECIMEN SOURCE: NORMAL

## 2018-04-06 ENCOUNTER — TELEPHONE (OUTPATIENT)
Dept: FAMILY MEDICINE | Facility: CLINIC | Age: 43
End: 2018-04-06

## 2018-04-06 NOTE — TELEPHONE ENCOUNTER
LMTRC    Notes Recorded by Alicia Walsh NP on 4/6/2018 at 8:00 AM  Please call with normal results. May use immodium at this point if still having some diarrhea.      DAVID Sanderson RN, BSN

## 2018-04-10 ENCOUNTER — HOSPITAL ENCOUNTER (OUTPATIENT)
Dept: MAMMOGRAPHY | Facility: CLINIC | Age: 43
Discharge: HOME | End: 2018-04-10
Attending: ADVANCED PRACTICE MIDWIFE | Admitting: ADVANCED PRACTICE MIDWIFE
Payer: COMMERCIAL

## 2018-04-10 ENCOUNTER — HOSPITAL ENCOUNTER (OUTPATIENT)
Dept: ULTRASOUND IMAGING | Facility: CLINIC | Age: 43
End: 2018-04-10
Attending: ADVANCED PRACTICE MIDWIFE
Payer: COMMERCIAL

## 2018-04-10 DIAGNOSIS — N63.0 BREAST LUMP: ICD-10-CM

## 2018-04-10 DIAGNOSIS — N63.10 LUMP OF RIGHT BREAST: ICD-10-CM

## 2018-04-10 PROCEDURE — 77066 DX MAMMO INCL CAD BI: CPT

## 2018-04-10 PROCEDURE — 76642 ULTRASOUND BREAST LIMITED: CPT | Mod: RT

## 2018-04-10 PROCEDURE — 76642 ULTRASOUND BREAST LIMITED: CPT | Mod: LT

## 2018-05-07 DIAGNOSIS — G25.81 RESTLESS LEGS SYNDROME (RLS): ICD-10-CM

## 2018-05-07 NOTE — TELEPHONE ENCOUNTER
"Requested Prescriptions   Pending Prescriptions Disp Refills     pramipexole (MIRAPEX) 0.125 MG tablet 90 tablet 3     Sig: Take 1 tablet (0.125 mg) by mouth At Bedtime    Antiparkinson's Agents Protocol Passed    5/7/2018  5:12 PM       Passed - Blood pressure under 140/90 in past 12 months    BP Readings from Last 3 Encounters:   04/03/18 (P) 114/78   02/07/18 106/71   01/31/18 106/70                Passed - Recent (12 mo) or future (30 days) visit within the authorizing provider's specialty    Patient had office visit in the last 12 months or has a visit in the next 30 days with authorizing provider or within the authorizing provider's specialty.  See \"Patient Info\" tab in inbasket, or \"Choose Columns\" in Meds & Orders section of the refill encounter.           Passed - Patient is age 18 or older       Passed - No active pregnancy on record       Passed - No positive pregnancy test in the past 12 months   Last Written Prescription Date:  01/31/2018  Last Fill Quantity: 90,  # refills: 3   Last office visit: 4/3/2018 with prescribing provider:  04/03/2018   Future Office Visit:         Patient states she is taking more than what prescription states but informed to due so by PCP    Mag Asif   "

## 2018-05-08 RX ORDER — PRAMIPEXOLE DIHYDROCHLORIDE 0.12 MG/1
0.25 TABLET ORAL AT BEDTIME
Qty: 90 TABLET | Refills: 3 | Status: SHIPPED | OUTPATIENT
Start: 2018-05-08

## 2018-05-08 NOTE — TELEPHONE ENCOUNTER
No documentation that pt could take more or what amount.  Will need to call back to ask how much pt is taking  Margarita Bojorquez RN, BSN

## 2018-05-08 NOTE — TELEPHONE ENCOUNTER
Pt called back and states she mainly takes 1 but there are several days that she ends up having to take 2.  Please advise on changing sig if appropriate    Margarita Bojorquez RN, BSN

## 2020-02-07 ENCOUNTER — ALLIED HEALTH/NURSE VISIT (OUTPATIENT)
Dept: NURSING | Facility: CLINIC | Age: 45
End: 2020-02-07
Payer: COMMERCIAL

## 2020-02-07 DIAGNOSIS — Z23 NEED FOR PROPHYLACTIC VACCINATION AND INOCULATION AGAINST INFLUENZA: Primary | ICD-10-CM

## 2020-02-07 PROCEDURE — 90686 IIV4 VACC NO PRSV 0.5 ML IM: CPT

## 2020-02-07 PROCEDURE — 90471 IMMUNIZATION ADMIN: CPT

## 2020-03-14 ENCOUNTER — OFFICE VISIT (OUTPATIENT)
Dept: URGENT CARE | Facility: URGENT CARE | Age: 45
End: 2020-03-14
Payer: COMMERCIAL

## 2020-03-14 VITALS
TEMPERATURE: 98.1 F | WEIGHT: 165 LBS | RESPIRATION RATE: 16 BRPM | BODY MASS INDEX: 25.65 KG/M2 | HEART RATE: 70 BPM | OXYGEN SATURATION: 98 % | SYSTOLIC BLOOD PRESSURE: 124 MMHG | DIASTOLIC BLOOD PRESSURE: 78 MMHG

## 2020-03-14 DIAGNOSIS — R07.0 THROAT PAIN: ICD-10-CM

## 2020-03-14 DIAGNOSIS — J06.9 VIRAL URI: Primary | ICD-10-CM

## 2020-03-14 LAB
DEPRECATED S PYO AG THROAT QL EIA: NEGATIVE
SPECIMEN SOURCE: NORMAL
SPECIMEN SOURCE: NORMAL
STREP GROUP A PCR: NOT DETECTED

## 2020-03-14 PROCEDURE — 87651 STREP A DNA AMP PROBE: CPT | Performed by: FAMILY MEDICINE

## 2020-03-14 PROCEDURE — 99213 OFFICE O/P EST LOW 20 MIN: CPT | Performed by: PHYSICIAN ASSISTANT

## 2020-03-14 PROCEDURE — 40001204 ZZHCL STATISTIC STREP A RAPID: Performed by: FAMILY MEDICINE

## 2020-03-14 ASSESSMENT — ENCOUNTER SYMPTOMS
NAUSEA: 0
HEADACHES: 0
FOCAL WEAKNESS: 0
SORE THROAT: 1
ABDOMINAL PAIN: 0
FEVER: 0
DIARRHEA: 0
VOMITING: 0
CHILLS: 0
SHORTNESS OF BREATH: 0

## 2020-03-14 NOTE — PROGRESS NOTES
HPI  March 14, 2020    HPI: Nidhi Nolan is a 45 year old female who complains of moderate sore throat & rhinorrhea onset last night. Symptoms are constant in duration. No treatments tried. Denies fever/chills, HA, CP, SOB, abd pain, N/V/D, rash, or any other symptoms. Patient was exposed to strep throat last week.    Past Medical History:   Diagnosis Date     Abnormal Pap smear of cervix     done elsewhere     History of colposcopy with cervical biopsy     done elsewhere     Past Surgical History:   Procedure Laterality Date     GYN SURGERY       Social History     Tobacco Use     Smoking status: Never Smoker     Smokeless tobacco: Never Used   Substance Use Topics     Alcohol use: Yes     Comment: kassy     Drug use: No     Patient Active Problem List   Diagnosis     Migraines     Hyperlipidemia LDL goal <160     Family History   Problem Relation Age of Onset     Hypertension Mother      Hypertension Father      Cancer Father         multiple mieloma     C.A.D. Maternal Grandfather         MI     Cardiovascular Maternal Grandfather      Breast Cancer Paternal Grandmother         Problem list, Medication list, Allergies, and Medical/Social/Surgical histories reviewed in [facility] and updated as appropriate.      Review of Systems   Constitutional: Negative for chills and fever.   HENT: Positive for sore throat.         Rhinorrhea   Respiratory: Negative for shortness of breath.    Cardiovascular: Negative for chest pain.   Gastrointestinal: Negative for abdominal pain, diarrhea, nausea and vomiting.   Skin: Negative for rash.   Neurological: Negative for focal weakness and headaches.   All other systems reviewed and are negative.      Physical Exam  Vitals signs and nursing note reviewed.   HENT:      Head: Normocephalic and atraumatic.      Right Ear: Tympanic membrane and external ear normal.      Left Ear: Tympanic membrane and external ear normal.      Nose: Rhinorrhea present. Rhinorrhea is clear.       Mouth/Throat:      Pharynx: Uvula midline. Posterior oropharyngeal erythema present. No pharyngeal swelling or oropharyngeal exudate.   Cardiovascular:      Rate and Rhythm: Normal rate and regular rhythm.      Heart sounds: Normal heart sounds.   Pulmonary:      Effort: Pulmonary effort is normal.      Breath sounds: Normal breath sounds.   Musculoskeletal: Normal range of motion.   Skin:     General: Skin is warm and dry.   Neurological:      Mental Status: She is alert and oriented to person, place, and time.       Vital Signs  /78   Pulse 70   Temp 98.1  F (36.7  C) (Oral)   Resp 16   Wt 74.8 kg (165 lb)   SpO2 98%   BMI (P) 25.84 kg/m       Diagnostic Test Results:  Results for orders placed or performed in visit on 03/14/20 (from the past 24 hour(s))   Streptococcus A Rapid Scr w Reflx to PCR    Specimen: Throat   Result Value Ref Range    Strep Specimen Description Throat     Streptococcus Group A Rapid Screen Negative NEG^Negative       ASSESSMENT/PLAN      ICD-10-CM    1. Viral URI  J06.9    2. Throat pain  R07.0 Streptococcus A Rapid Scr w Reflx to PCR     Group A Streptococcus PCR Throat Swab        Strep negative. Suspect viral URI, advised antihistamine such as Zyrtec.    I have discussed any lab or imaging results, the patient's diagnosis, and my plan of treatment with the patient and/or family. Patient is aware to come back in if with worsening symptoms or if no relief despite treatment plan.  Patient voiced understanding and had no further questions.       Follow Up: Return in about 1 week (around 3/21/2020) for Follow up w/ primary care provider if not better.    YESENIA Schneider, PAHannyC  [facility]